# Patient Record
Sex: FEMALE | Race: WHITE
[De-identification: names, ages, dates, MRNs, and addresses within clinical notes are randomized per-mention and may not be internally consistent; named-entity substitution may affect disease eponyms.]

---

## 2018-03-11 ENCOUNTER — HOSPITAL ENCOUNTER (EMERGENCY)
Dept: HOSPITAL 94 - ER | Age: 53
Discharge: LEFT BEFORE DISCHARGE | End: 2018-03-11
Payer: MEDICAID

## 2018-03-11 VITALS — WEIGHT: 149.91 LBS | HEIGHT: 72 IN | BODY MASS INDEX: 20.31 KG/M2

## 2018-03-11 VITALS — DIASTOLIC BLOOD PRESSURE: 80 MMHG | SYSTOLIC BLOOD PRESSURE: 132 MMHG

## 2018-03-11 DIAGNOSIS — Z79.899: ICD-10-CM

## 2018-03-11 DIAGNOSIS — L29.9: Primary | ICD-10-CM

## 2018-03-11 DIAGNOSIS — I10: ICD-10-CM

## 2018-03-11 DIAGNOSIS — Z86.19: ICD-10-CM

## 2018-03-11 PROCEDURE — 99282 EMERGENCY DEPT VISIT SF MDM: CPT

## 2021-06-02 ENCOUNTER — HOSPITAL ENCOUNTER (EMERGENCY)
Dept: HOSPITAL 94 - ER | Age: 56
Discharge: HOME | End: 2021-06-02
Payer: MEDICAID

## 2021-06-02 VITALS — SYSTOLIC BLOOD PRESSURE: 142 MMHG | DIASTOLIC BLOOD PRESSURE: 76 MMHG

## 2021-06-02 VITALS — HEIGHT: 63 IN | WEIGHT: 121.03 LBS | BODY MASS INDEX: 21.45 KG/M2

## 2021-06-02 DIAGNOSIS — M79.18: ICD-10-CM

## 2021-06-02 DIAGNOSIS — Z72.89: ICD-10-CM

## 2021-06-02 DIAGNOSIS — M54.9: Primary | ICD-10-CM

## 2021-06-02 DIAGNOSIS — F32.9: ICD-10-CM

## 2021-06-02 DIAGNOSIS — I10: ICD-10-CM

## 2021-06-02 DIAGNOSIS — R10.84: ICD-10-CM

## 2021-06-02 PROCEDURE — 99284 EMERGENCY DEPT VISIT MOD MDM: CPT

## 2021-06-02 PROCEDURE — 96372 THER/PROPH/DIAG INJ SC/IM: CPT

## 2021-07-14 NOTE — NUR
ATTEMPTED TO CALL PT REGARDING VISIT ON 7/9.  RECEIVED A MSG "PHONE CALL CAN 
NOT BE COMPEATED AT THIS TIME, PLEASE TRY AGAIN LATER".  UNABLE TO LEAVE MSG.

## 2021-07-15 NOTE — NUR
Attempted to call patient at listed next of kin number due to patient not 
having a phone number listed, call could not be completed at dialed. No listed 
address to send letter.

## 2021-07-15 NOTE — NUR
Per Dr. Sanches, patient is to be paced on clindamycin 300 mg PO QID x 10 days 
Disp #40 and to stop taking Septra.